# Patient Record
Sex: MALE | Race: WHITE | NOT HISPANIC OR LATINO | Employment: FULL TIME | URBAN - METROPOLITAN AREA
[De-identification: names, ages, dates, MRNs, and addresses within clinical notes are randomized per-mention and may not be internally consistent; named-entity substitution may affect disease eponyms.]

---

## 2018-01-14 NOTE — RESULT NOTES
Message  celiac disease panel is negative      Signatures   Electronically signed by : SOMMER Forrester ; Oct 21 2016  4:16PM EST                       (Author)

## 2023-06-23 ENCOUNTER — TELEPHONE (OUTPATIENT)
Dept: FAMILY MEDICINE CLINIC | Facility: CLINIC | Age: 33
End: 2023-06-23

## 2023-06-23 NOTE — TELEPHONE ENCOUNTER
Patient called and left a message to re est with Dr Rich Amin     I called patient and left a message to return our call to set up an appoint